# Patient Record
Sex: MALE | Race: WHITE | NOT HISPANIC OR LATINO | URBAN - METROPOLITAN AREA
[De-identification: names, ages, dates, MRNs, and addresses within clinical notes are randomized per-mention and may not be internally consistent; named-entity substitution may affect disease eponyms.]

---

## 2017-02-27 ENCOUNTER — COMMUNICATION - HEALTHEAST (OUTPATIENT)
Dept: TELEHEALTH | Facility: CLINIC | Age: 32
End: 2017-02-27

## 2017-02-27 ENCOUNTER — OFFICE VISIT - HEALTHEAST (OUTPATIENT)
Dept: INTERNAL MEDICINE | Facility: CLINIC | Age: 32
End: 2017-02-27

## 2017-02-27 DIAGNOSIS — F41.9 ANXIETY: ICD-10-CM

## 2017-02-27 DIAGNOSIS — Z00.00 HEALTH CARE MAINTENANCE: ICD-10-CM

## 2017-02-27 DIAGNOSIS — F33.2 SEVERE EPISODE OF RECURRENT MAJOR DEPRESSIVE DISORDER, WITHOUT PSYCHOTIC FEATURES (H): ICD-10-CM

## 2017-02-27 ASSESSMENT — MIFFLIN-ST. JEOR: SCORE: 1673.82

## 2017-02-28 ENCOUNTER — COMMUNICATION - HEALTHEAST (OUTPATIENT)
Dept: INTERNAL MEDICINE | Facility: CLINIC | Age: 32
End: 2017-02-28

## 2021-05-30 VITALS — BODY MASS INDEX: 24.44 KG/M2 | HEIGHT: 69 IN | WEIGHT: 165 LBS

## 2021-06-09 NOTE — PROGRESS NOTES
Office Visit - New Patient   Franco Kelly   31 y.o.  male    Date of visit: 2/27/2017  Physician: Benson Jenkins MD     Assessment and Plan   1. Severe episode of recurrent major depressive disorder, without psychotic features  Concerning.  Labs today.  He would like to see a counselor which I think would be a good idea.  We'll get that set up.  Given his poor motivation at times I think that's resuming Wellbutrin would be a good idea.  He's been on this in the past.  No history of seizures.  It may help him quit smoking as well.  Counseled him on that today.  He is not to drink with this medication.  He'll begin 150 mg a day for 4 days and increase to 300 mg daily.  Call if problems.  Also he him back in 3 weeks and we'll see how he is doing.  - Ambulatory referral to Psychology  - Thyroid Stimulating Hormone (TSH)    2. Anxiety  As above.  I think that counselor would be a good idea here.  - Ambulatory referral to Psychology  - Thyroid Stimulating Hormone (TSH)    3. Health care maintenance  Counseled on smoking cessation.  He should return and we can do labs etc.  He lives a pretty healthy lifestyle although he does need to give up the cigarettes and cut back alcohol.  We discussed safe sexual practices today as well.        Return in about 3 weeks (around 3/20/2017) for Recheck.     Chief Complaint   Chief Complaint   Patient presents with     Establish Care     Self-referred      Depression     feeling down & depress - doesn't see a psychiartist        Patient Profile   Social History     Social History Narrative     No narrative on file        Past Medical History   Patient Active Problem List   Diagnosis     Major depressive disorder       Past Surgical History  He has no past surgical history on file.     History of Present Illness   This 31 y.o. old Franco is a new patient to the clinic.  Self-referred.  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  He has a history of depression and anxiety around the time he graduated from high school.  Was on psychiatric medications at that time.  Has done fairly well over the years but the last couple years she has not done well.  He ended a 3 year long-term relationship.  His parents got  he changed jobs.  All this has been kind of stressful on him.  He has started to stay at home more.  Doesn't go out on dates.  Doesn't go out with friends.  Has a hard time evening going to visit family.  6 he has been drinking and his home.  He smokes a half a pack a day.  He believes that his anxiety and mood are causing issues at work.  He's gained a little weight because he doesn't exercise as much as he used to.    Parents are alive and well.  They're  as above.  He has 2 older brothers and 1 sister.  Currently not sexually active.  [place] male.    Review of Systems: A comprehensive review of systems was negative except as noted.     Medications and Allergies   Current Outpatient Prescriptions   Medication Sig Dispense Refill     buPROPion (WELLBUTRIN XL) 150 MG 24 hr tablet Take one in the morning for 4 days, then take 2 every morning. 60 tablet 1     No current facility-administered medications for this visit.      No Known Allergies     Family and Social History   No family history on file.     Social History   Substance Use Topics     Smoking status: Smoker, Current Status Unknown     Packs/day: 0.50     Smokeless tobacco: None     Alcohol use Yes        Physical Exam   General Appearance:   Pleasant young gentleman in no distress.  Not tearful.  [facility] Q9.    Visit Vitals     /80 (Patient Site: Right Arm, Patient Position: Sitting, Cuff Size: Adult Regular)     Pulse 82     Ht 5' 9\" (1.753 m)     Wt 165 lb (74.8 kg)     SpO2 98%     BMI 24.37 kg/m2       EYES: Eyelids, conjunctiva, and sclera were normal. Pupils were normal. Cornea, iris, and lens were normal " bilaterally.  HEAD, EARS, NOSE, MOUTH, AND THROAT: Head and face were normal. Hearing was normal to voice and the ears were normal to external exam. Nose appearance was normal and there was no discharge. Oropharynx was normal.  NECK: Neck appearance was normal. There were no neck masses and the thyroid was not enlarged.  RESPIRATORY: Breathing pattern was normal and the chest moved symmetrically.  Percussion/auscultatory percussion was normal.  Lung sounds were normal and there were no abnormal sounds.  CARDIOVASCULAR: Heart rate and rhythm were normal.  S1 and S2 were normal and there were no extra sounds or murmurs. Peripheral pulses in arms and legs were normal.  Jugular venous pressure was normal.  There was no peripheral edema.  GASTROINTESTINAL: The abdomen was normal in contour.  Bowel sounds were present.  Percussion detected no organ enlargement or tenderness.  Palpation detected no tenderness, mass, or enlarged organs.   MUSCULOSKELETAL: Skeletal configuration was normal and muscle mass was normal for age. Joint appearance was overall normal.  LYMPHATIC: There were no enlarged nodes.  SKIN/HAIR/NAILS: Skin color was normal.    Hair and nails were normal.  NEUROLOGIC: The patient was alert and oriented to person, place, time, and circumstance. Speech was normal. Cranial nerves were normal. Motor strength was normal for age. The patient was normally coordinated.  PSYCHIATRIC:  Mood and affect were normal and the patient had normal recent and remote memory. The patient's judgment and insight were normal.    ADDITIONAL VITAL SIGNS: none  CHEST WALL/BREASTS:     RECTAL: def  GENITAL/URINARY: def     Additional Information     Review and/or order of clinical lab tests:  Review and/or order of radiology tests:  Review and/or order of medicine tests:  Discussion of test results with performing physician:  Decision to obtain old records and/or obtain history from someone other than the patient:  Review and  summarization of old records and/or obtaining history from someone other than the patient and.or discussion of case with another health care provider:  Independent visualization of image, tracing or specimen itself:    Time: total time spent with the patient was 45 minutes of which >50% was spent in counseling and coordination of care     Benson Jenkins MD  Internal Medicine  Contact me at [phone]

## 2023-06-02 ENCOUNTER — HEALTH MAINTENANCE LETTER (OUTPATIENT)
Age: 38
End: 2023-06-02